# Patient Record
Sex: MALE | Race: WHITE | NOT HISPANIC OR LATINO | Employment: FULL TIME | URBAN - METROPOLITAN AREA
[De-identification: names, ages, dates, MRNs, and addresses within clinical notes are randomized per-mention and may not be internally consistent; named-entity substitution may affect disease eponyms.]

---

## 2018-01-17 ENCOUNTER — OFFICE VISIT (OUTPATIENT)
Dept: PEDIATRICS | Facility: CLINIC | Age: 23
End: 2018-01-17
Payer: COMMERCIAL

## 2018-01-17 VITALS
TEMPERATURE: 98.6 F | OXYGEN SATURATION: 98 % | HEART RATE: 88 BPM | WEIGHT: 151.2 LBS | DIASTOLIC BLOOD PRESSURE: 68 MMHG | HEIGHT: 69 IN | BODY MASS INDEX: 22.39 KG/M2 | SYSTOLIC BLOOD PRESSURE: 110 MMHG

## 2018-01-17 DIAGNOSIS — H10.31 ACUTE BACTERIAL CONJUNCTIVITIS OF RIGHT EYE: ICD-10-CM

## 2018-01-17 DIAGNOSIS — H65.01 RIGHT ACUTE SEROUS OTITIS MEDIA, RECURRENCE NOT SPECIFIED: Primary | ICD-10-CM

## 2018-01-17 PROCEDURE — 99213 OFFICE O/P EST LOW 20 MIN: CPT | Performed by: PHYSICIAN ASSISTANT

## 2018-01-17 RX ORDER — AMOXICILLIN 500 MG/1
1000 CAPSULE ORAL 2 TIMES DAILY
Qty: 40 CAPSULE | Refills: 0 | Status: SHIPPED | OUTPATIENT
Start: 2018-01-17 | End: 2018-12-17

## 2018-01-17 RX ORDER — POLYMYXIN B SULFATE AND TRIMETHOPRIM 1; 10000 MG/ML; [USP'U]/ML
1 SOLUTION OPHTHALMIC 4 TIMES DAILY
Qty: 2 ML | Refills: 0 | Status: SHIPPED | OUTPATIENT
Start: 2018-01-17 | End: 2018-01-24

## 2018-01-17 NOTE — PATIENT INSTRUCTIONS
Conjunctivitis  What is eye inflammation?   The clear membrane that lines the inside of the eyelids and covers the white of the eye (conjunctiva) can get red and swollen. This is called conjunctivitis.   How does it occur?   Conjunctivitis can be caused by many things, including infection by viruses or bacteria. Many kinds of bacteria can cause conjunctivitis. These include bacteria that cause strep, staph, and STD infections.   Conjunctivitis caused by a virus is sometimes called pink eye. It can be spread easily to other people. The same viruses that cause the common cold can cause viral conjunctivitis. Viruses can be spread by coughing or sneezing and can get in your eyes through contact with infected:  hands   washcloths or towels   cosmetics   false eyelashes   soft contact lenses  Avoid unnecessary contact with others so that you do not spread the disease.   What are the symptoms?   Symptoms may include:  itchy or scratchy eyes   redness   painful sensitivity to light   swelling of eyelids   matting of eyelashes   watery or pus discharge  How is it diagnosed?   Your healthcare provider will ask about your medical history and if you have been near someone who has conjunctivitis. Your provider will examine your eyes. He or she will also check for enlarged lymph nodes near your ear and jaw. Your provider may get lab tests of a sample of the pus to see what type of germs are present.  How is it treated?   Like a cold, viral conjunctivitis will usually go away on its own without treatment. However, your healthcare provider may prescribe eyedrops to help control your symptoms. Antihistamine pills may also relieve the itching and redness.  If you have bacterial conjunctivitis, your healthcare provider will prescribe antibiotic eyedrops. You can also help your eyes get better by washing them gently to remove any pus or crusts. Then dry them gently with a clean towel.  For very severe forms of  conjunctivitis, antibiotics may need to be given by mouth or with a shot or an IV.  If you wear contact lenses, you will need to stop wearing them until your eyes are healed. The combination of contacts and conjunctivitis may damage your cornea (the clear outer layer on the front of your eye) and cause severe vision problems. Your provider may ask you to throw away your current contact lenses and lens case.  How long will the effects last?   Viral conjunctivitis usually gets worse 5 to 7 days after the first symptoms. It can get better in 10 days to 1 month. If only one eye is affected at first, the other eye may become infected up to 2 weeks later. Usually, if both eyes are affected, the first eye has worse conjunctivitis than the second.  Bacterial conjunctivitis should improve within 2 days after you begin using antibiotics. If your eyes are not better after 3 days of antibiotics, call your healthcare provider.  How can I prevent conjunctivitis?   To keep from getting conjunctivitis from someone who has it, or to keep from spreading it to others, follow these guidelines:  Wash your hands often. Do not touch or rub your eyes.   Never share eye makeup or cosmetics with anyone. When you have conjunctivitis, throw out eye makeup you have been using.   Never use eye medicine that has been prescribed for someone else.   Do not share towels, washcloths, pillows, or sheets with anyone. If one of your eyes is affected but not the other, use a separate towel for each eye.   Avoid swimming in swimming pools if you have conjunctivitis.   Avoid close contact with people until your symptoms improve. Depending on your job, you may be asked to take some time off from work.  When should I call my healthcare provider?   Call your provider if:  You have any severe eye pain.   Your symptoms do not improve after you have used your medicine for 3 days (if you have bacterial conjunctivitis).   Your symptoms do not improve after 2 weeks  (if you have viral conjunctivitis).   Your eyes get very sensitive to light, even after the redness is gone.  Reviewed for medical accuracy by faculty at the [facility] at [facility]. Web site: [email or web address]

## 2018-01-17 NOTE — NURSING NOTE
"Chief Complaint   Patient presents with     Flu     Eye Problem       Initial /68 (BP Location: Right arm, Cuff Size: Adult Regular)  Pulse 88  Temp 98.6  F (37  C) (Oral)  Ht 5' 9\" (1.753 m)  Wt 151 lb 3.2 oz (68.6 kg)  SpO2 98%  BMI 22.33 kg/m2 Estimated body mass index is 22.33 kg/(m^2) as calculated from the following:    Height as of this encounter: 5' 9\" (1.753 m).    Weight as of this encounter: 151 lb 3.2 oz (68.6 kg).  Medication Reconciliation: complete   Jovanni Villegas CMA      "

## 2018-01-17 NOTE — PROGRESS NOTES
"  SUBJECTIVE:   Yovani Ibarra is a 22 year old male who presents to clinic today for the following health issues:    Acute Illness   Acute illness concerns: eye  Onset: 5 days    Fever: YES    Chills/Sweats: YES- both    Headache (location?): YES- frontal and temporla    Sinus Pressure:YES- mucopurulent discharge    Conjunctivitis:  YES: right--redness of eye    No contact lenses    Yellow discharge present x today    No light sensitivity    No eye pain    Irritation present    Ear Pain: no    Rhinorrhea: no    Congestion: YES- nasal and chest    Sore Throat: YES- getting better     Cough: YES-productive of yellow sputum    Wheeze: no    Decreased Appetite: no    Nausea: no    Vomiting: no    Diarrhea:  no    Dysuria/Freq.: no    Fatigue/Achiness: YES- only on sat    Sick/Strep Exposure: YES- school     Therapies Tried and outcome: none  No history of asthma, allergies.   Nonsmoker.   Special .     ROS:  ROS otherwise negative    OBJECTIVE:                                                    /68 (BP Location: Right arm, Cuff Size: Adult Regular)  Pulse 88  Temp 98.6  F (37  C) (Oral)  Ht 5' 9\" (1.753 m)  Wt 151 lb 3.2 oz (68.6 kg)  SpO2 98%  BMI 22.33 kg/m2  Body mass index is 22.33 kg/(m^2).   GENERAL: alert, no distress  Eyes:  conjunctiva/corneas- conjunctival injection OD and yellow colored discharge present right  HENT: ear canals- normal; TMs-erythemic on right; wnl on left; Nose- normal; Mouth-erythemic posterior pharynx; no sinus tenderness   NECK: tonsillar LAD  RESP: lungs clear to auscultation - no rales, no rhonchi, no wheezes  CV: regular rates and rhythm, normal S1 S2, no S3 or S4 and no murmur, no click or rub     Diagnostic test results:  No results found for this or any previous visit (from the past 24 hour(s)).       ASSESSMENT/PLAN:                                                    (H65.01) Right acute serous otitis media, recurrence not specified  (primary encounter " diagnosis)  Comment: begin antibiotics.   Plan: amoxicillin (AMOXIL) 500 MG capsule            (H10.31) Acute bacterial conjunctivitis of right eye  Comment: limit exposures.   Plan: trimethoprim-polymyxin b (POLYTRIM) ophthalmic         solution            See Patient Instructions    Luis Sarmiento PA-C  [facility]

## 2018-01-17 NOTE — MR AVS SNAPSHOT
After Visit Summary   1/17/2018    Yovani Ibarra    MRN: [id number]           Patient Information     Date Of Birth          1995        Visit Information        Provider Department      1/17/2018 11:30 AM Luis Sarmiento PA-C [facility]        Today's Diagnoses     Right acute serous otitis media, recurrence not specified    -  1    Acute bacterial conjunctivitis of right eye          Care Instructions                    Conjunctivitis  What is eye inflammation?   The clear membrane that lines the inside of the eyelids and covers the white of the eye (conjunctiva) can get red and swollen. This is called conjunctivitis.   How does it occur?   Conjunctivitis can be caused by many things, including infection by viruses or bacteria. Many kinds of bacteria can cause conjunctivitis. These include bacteria that cause strep, staph, and STD infections.   Conjunctivitis caused by a virus is sometimes called pink eye. It can be spread easily to other people. The same viruses that cause the common cold can cause viral conjunctivitis. Viruses can be spread by coughing or sneezing and can get in your eyes through contact with infected:  hands   washcloths or towels   cosmetics   false eyelashes   soft contact lenses  Avoid unnecessary contact with others so that you do not spread the disease.   What are the symptoms?   Symptoms may include:  itchy or scratchy eyes   redness   painful sensitivity to light   swelling of eyelids   matting of eyelashes   watery or pus discharge  How is it diagnosed?   Your healthcare provider will ask about your medical history and if you have been near someone who has conjunctivitis. Your provider will examine your eyes. He or she will also check for enlarged lymph nodes near your ear and jaw. Your provider may get lab tests of a sample of the pus to see what type of germs are present.  How is it treated?   Like a cold, viral conjunctivitis will usually  go away on its own without treatment. However, your healthcare provider may prescribe eyedrops to help control your symptoms. Antihistamine pills may also relieve the itching and redness.  If you have bacterial conjunctivitis, your healthcare provider will prescribe antibiotic eyedrops. You can also help your eyes get better by washing them gently to remove any pus or crusts. Then dry them gently with a clean towel.  For very severe forms of conjunctivitis, antibiotics may need to be given by mouth or with a shot or an IV.  If you wear contact lenses, you will need to stop wearing them until your eyes are healed. The combination of contacts and conjunctivitis may damage your cornea (the clear outer layer on the front of your eye) and cause severe vision problems. Your provider may ask you to throw away your current contact lenses and lens case.  How long will the effects last?   Viral conjunctivitis usually gets worse 5 to 7 days after the first symptoms. It can get better in 10 days to 1 month. If only one eye is affected at first, the other eye may become infected up to 2 weeks later. Usually, if both eyes are affected, the first eye has worse conjunctivitis than the second.  Bacterial conjunctivitis should improve within 2 days after you begin using antibiotics. If your eyes are not better after 3 days of antibiotics, call your healthcare provider.  How can I prevent conjunctivitis?   To keep from getting conjunctivitis from someone who has it, or to keep from spreading it to others, follow these guidelines:  Wash your hands often. Do not touch or rub your eyes.   Never share eye makeup or cosmetics with anyone. When you have conjunctivitis, throw out eye makeup you have been using.   Never use eye medicine that has been prescribed for someone else.   Do not share towels, washcloths, pillows, or sheets with anyone. If one of your eyes is affected but not the other, use a separate towel for each eye.   Avoid  "swimming in swimming pools if you have conjunctivitis.   Avoid close contact with people until your symptoms improve. Depending on your job, you may be asked to take some time off from work.  When should I call my healthcare provider?   Call your provider if:  You have any severe eye pain.   Your symptoms do not improve after you have used your medicine for 3 days (if you have bacterial conjunctivitis).   Your symptoms do not improve after 2 weeks (if you have viral conjunctivitis).   Your eyes get very sensitive to light, even after the redness is gone.  Reviewed for medical accuracy by faculty at the [facility] at [facility]. Web site: [email or web address]              Follow-ups after your visit        Who to contact     If you have questions or need follow up information about today's clinic visit or your schedule please contact [facility] directly at [phone].  Normal or non-critical lab and imaging results will be communicated to you by MyChart, letter or phone within 4 business days after the clinic has received the results. If you do not hear from us within 7 days, please contact the clinic through [employer] or phone. If you have a critical or abnormal lab result, we will notify you by phone as soon as possible.  Submit refill requests through Roll20 or call your pharmacy and they will forward the refill request to us. Please allow 3 business days for your refill to be completed.          Additional Information About Your Visit        VividCortex[employer]IntelliDOT Information     Roll20 lets you send messages to your doctor, view your test results, renew your prescriptions, schedule appointments and more. To sign up, go to [email or web address] . Click on \"Log in\" on the left side of the screen, which will take you to the Welcome page. Then click on \"Sign up Now\" on the right side of the page.     You will be asked to enter the access code listed below, as well as some personal " "information. Please follow the directions to create your username and password.     Your access code is: [email or web address]  Expires: 2018 11:51 AM     Your access code will  in 90 days. If you need help or a new code, please call your [facility] clinic or [phone].        Care EveryWhere ID     This is your Care EveryWhere ID. This could be used by other organizations to access your [facility] medical records  [id number]        Your Vitals Were     Pulse Temperature Height Pulse Oximetry BMI (Body Mass Index)       88 98.6  F (37  C) (Oral) 5' 9\" (1.753 m) 98% 22.33 kg/m2        Blood Pressure from Last 3 Encounters:   18 110/68   09/15/15 98/60   14 105/68    Weight from Last 3 Encounters:   18 151 lb 3.2 oz (68.6 kg)   09/15/15 153 lb 6 oz (69.6 kg)   14 159 lb (72.1 kg)              Today, you had the following     No orders found for display         Today's Medication Changes          These changes are accurate as of: 18 11:51 AM.  If you have any questions, ask your nurse or doctor.               Start taking these medicines.        Dose/Directions    amoxicillin 500 MG capsule   Commonly known as:  AMOXIL   Used for:  Right acute serous otitis media, recurrence not specified   Started by:  Luis Sarmiento PA-C        Dose:  1000 mg   Take 2 capsules (1,000 mg) by mouth 2 times daily   Quantity:  40 capsule   Refills:  0       trimethoprim-polymyxin b ophthalmic solution   Commonly known as:  POLYTRIM   Used for:  Acute bacterial conjunctivitis of right eye   Started by:  Luis Sarmiento PA-C        Dose:  1 drop   Place 1 drop into the right eye 4 times daily for 7 days   Quantity:  2 mL   Refills:  0            Where to get your medicines      These medications were sent to [place] [address] [place] [facility]  [facility] [address]     Phone:  [phone]     amoxicillin 500 MG capsule    trimethoprim-polymyxin b " ophthalmic solution                Primary Care Provider Office Phone # Fax #    Bayron Quinteros [phone] [address] [place] [address]        Equal Access to Services     GEOVANNI SHIMICHELLE : Ismael ashtyn yoon ajay Boyd, waaxda luqadaha, qaybta kaalmada al, siri duff lajeannedylon ana m. So [facility] [phone].    ATENCIÓN: Si habla español, tiene a lopes disposición servicios gratuitos de asistencia lingüística. Llame al [phone].    We comply with applicable federal civil rights laws and [place] laws. We do not discriminate on the basis of race, color, national origin, age, disability, sex, sexual orientation, or gender identity.            Thank you!     Thank you for choosing [facility]  for your care. Our goal is always to provide you with excellent care. Hearing back from our patients is one way we can continue to improve our services. Please take a few minutes to complete the written survey that you may receive in the mail after your visit with us. Thank you!             Your Updated Medication List - Protect others around you: Learn how to safely use, store and throw away your medicines at www.disposemymeds.org.          This list is accurate as of: 1/17/18 11:51 AM.  Always use your most recent med list.                   Brand Name Dispense Instructions for use Diagnosis    amoxicillin 500 MG capsule    AMOXIL    40 capsule    Take 2 capsules (1,000 mg) by mouth 2 times daily    Right acute serous otitis media, recurrence not specified       trimethoprim-polymyxin b ophthalmic solution    POLYTRIM    2 mL    Place 1 drop into the right eye 4 times daily for 7 days    Acute bacterial conjunctivitis of right eye

## 2018-12-17 ENCOUNTER — OFFICE VISIT (OUTPATIENT)
Dept: URGENT CARE | Facility: URGENT CARE | Age: 23
End: 2018-12-17
Payer: COMMERCIAL

## 2018-12-17 VITALS
OXYGEN SATURATION: 100 % | HEART RATE: 75 BPM | RESPIRATION RATE: 12 BRPM | TEMPERATURE: 98.6 F | SYSTOLIC BLOOD PRESSURE: 110 MMHG | DIASTOLIC BLOOD PRESSURE: 54 MMHG

## 2018-12-17 DIAGNOSIS — K64.9 BLEEDING HEMORRHOID: Primary | ICD-10-CM

## 2018-12-17 PROCEDURE — 99213 OFFICE O/P EST LOW 20 MIN: CPT | Performed by: PHYSICIAN ASSISTANT

## 2018-12-18 NOTE — NURSING NOTE
Chief Complaint   Patient presents with     Urgent Care     Hemorrhoids     Pain and bleeding today     S SHAMEKA, CMA

## 2018-12-18 NOTE — PROGRESS NOTES
"  SUBJECTIVE:  Yovani Ibarra is a 23 year old male  who presents with chief complaint of blood from hemorrhoid he believes.  States that has had pressure in rectal area for the past 4 days.  Has been doing warm soaks for sx.  States that he sneezed at work and had a big  release of pressure but noticed blood.  Sneezed again and some more blood.  Feels much better now.  .  He denies any abdominal pain, nausea or vomiting or fevers.  No history of melena, profuse bleeding.  He denies any pain. history of hemorrhoids.  Does admit that he pushes very hard with BM and \"maybe a little to hard at times\"    No dizziness.  Thinks that fine but just wants to make sure.  No hx of bleeding or clotting issues.      Past Medical History:   Diagnosis Date     Other atopic dermatitis and related conditions      Viral warts, unspecified 2004     No current outpatient medications on file.     Social History     Tobacco Use     Smoking status: Never Smoker     Smokeless tobacco: Never Used   Substance Use Topics     Alcohol use: No     Alcohol/week: 0.0 oz       ROS:  Complete ROS negative except as stated above    OBJECTIVE:  /54   Pulse 75   Temp 98.6  F (37  C)   Resp 12   SpO2 100%   General appearance: in no apparent distress.  Chest: clear to auscultation  Cardiac: regular rate and rhythm.  Abdomen: normal bowel sound, soft,non-tender.  Rectal exam: no tenderness noted.  No visible external hemorrhoid noted. Dried blood.  No active bleeding. No pain with internal exam.  Normal tone    ASSESSMENT:    ICD-10-CM    1. Bleeding hemorrhoid K64.9        PLAN:  No active bleeding noted.  Likely burst hemorrhoid from sneezing.  Good bowel hygiene discussed  Recommend Sitz baths, ice pack to perineum, high fiber diet and f/u with primary physician if bleeding starts again.  No labs needed.      "

## 2019-03-30 ENCOUNTER — APPOINTMENT (OUTPATIENT)
Dept: CT IMAGING | Facility: CLINIC | Age: 24
End: 2019-03-30
Attending: EMERGENCY MEDICINE
Payer: COMMERCIAL

## 2019-03-30 ENCOUNTER — HOSPITAL ENCOUNTER (EMERGENCY)
Facility: CLINIC | Age: 24
Discharge: HOME | End: 2019-03-30
Attending: EMERGENCY MEDICINE | Admitting: EMERGENCY MEDICINE
Payer: COMMERCIAL

## 2019-03-30 ENCOUNTER — APPOINTMENT (OUTPATIENT)
Dept: ULTRASOUND IMAGING | Facility: CLINIC | Age: 24
End: 2019-03-30
Attending: EMERGENCY MEDICINE
Payer: COMMERCIAL

## 2019-03-30 VITALS
WEIGHT: 150 LBS | TEMPERATURE: 97.8 F | SYSTOLIC BLOOD PRESSURE: 105 MMHG | RESPIRATION RATE: 18 BRPM | BODY MASS INDEX: 22.15 KG/M2 | OXYGEN SATURATION: 80 % | DIASTOLIC BLOOD PRESSURE: 67 MMHG | HEART RATE: 104 BPM

## 2019-03-30 DIAGNOSIS — R10.11 RUQ ABDOMINAL PAIN: ICD-10-CM

## 2019-03-30 DIAGNOSIS — R17 ELEVATED BILIRUBIN: ICD-10-CM

## 2019-03-30 LAB
ALBUMIN SERPL-MCNC: 3.9 G/DL (ref 3.4–5)
ALBUMIN UR-MCNC: NEGATIVE MG/DL
ALP SERPL-CCNC: 87 U/L (ref 40–150)
ALT SERPL W P-5'-P-CCNC: 31 U/L (ref 0–70)
ANION GAP SERPL CALCULATED.3IONS-SCNC: 7 MMOL/L (ref 3–14)
APPEARANCE UR: CLEAR
AST SERPL W P-5'-P-CCNC: 34 U/L (ref 0–45)
BASOPHILS # BLD AUTO: 0 10E9/L (ref 0–0.2)
BASOPHILS NFR BLD AUTO: 0.1 %
BILIRUB DIRECT SERPL-MCNC: 0.6 MG/DL (ref 0–0.2)
BILIRUB SERPL-MCNC: 2.3 MG/DL (ref 0.2–1.3)
BILIRUB UR QL STRIP: NEGATIVE
BUN SERPL-MCNC: 11 MG/DL (ref 7–30)
CALCIUM SERPL-MCNC: 8.8 MG/DL (ref 8.5–10.1)
CHLORIDE SERPL-SCNC: 100 MMOL/L (ref 94–109)
CO2 SERPL-SCNC: 29 MMOL/L (ref 20–32)
COLOR UR AUTO: ABNORMAL
CREAT SERPL-MCNC: 0.96 MG/DL (ref 0.66–1.25)
DIFFERENTIAL METHOD BLD: ABNORMAL
EOSINOPHIL # BLD AUTO: 0 10E9/L (ref 0–0.7)
EOSINOPHIL NFR BLD AUTO: 0.1 %
ERYTHROCYTE [DISTWIDTH] IN BLOOD BY AUTOMATED COUNT: 11.9 % (ref 10–15)
GFR SERPL CREATININE-BSD FRML MDRD: >90 ML/MIN/{1.73_M2}
GLUCOSE SERPL-MCNC: 126 MG/DL (ref 70–99)
GLUCOSE UR STRIP-MCNC: NEGATIVE MG/DL
HCT VFR BLD AUTO: 42 % (ref 40–53)
HGB BLD-MCNC: 14.9 G/DL (ref 13.3–17.7)
HGB UR QL STRIP: NEGATIVE
HYALINE CASTS #/AREA URNS LPF: 1 /LPF (ref 0–2)
IMM GRANULOCYTES # BLD: 0.1 10E9/L (ref 0–0.4)
IMM GRANULOCYTES NFR BLD: 0.3 %
INR PPP: 1.06 (ref 0.86–1.14)
KETONES UR STRIP-MCNC: 20 MG/DL
LEUKOCYTE ESTERASE UR QL STRIP: NEGATIVE
LIPASE SERPL-CCNC: 105 U/L (ref 73–393)
LYMPHOCYTES # BLD AUTO: 0.5 10E9/L (ref 0.8–5.3)
LYMPHOCYTES NFR BLD AUTO: 2.9 %
MCH RBC QN AUTO: 29.7 PG (ref 26.5–33)
MCHC RBC AUTO-ENTMCNC: 35.5 G/DL (ref 31.5–36.5)
MCV RBC AUTO: 84 FL (ref 78–100)
MONOCYTES # BLD AUTO: 0.6 10E9/L (ref 0–1.3)
MONOCYTES NFR BLD AUTO: 3.9 %
MUCOUS THREADS #/AREA URNS LPF: PRESENT /LPF
NEUTROPHILS # BLD AUTO: 14.9 10E9/L (ref 1.6–8.3)
NEUTROPHILS NFR BLD AUTO: 92.7 %
NITRATE UR QL: NEGATIVE
NRBC # BLD AUTO: 0 10*3/UL
NRBC BLD AUTO-RTO: 0 /100
PH UR STRIP: 7.5 PH (ref 5–7)
PLATELET # BLD AUTO: 291 10E9/L (ref 150–450)
POTASSIUM SERPL-SCNC: 3.4 MMOL/L (ref 3.4–5.3)
PROT SERPL-MCNC: 7.1 G/DL (ref 6.8–8.8)
RBC # BLD AUTO: 5.02 10E12/L (ref 4.4–5.9)
RBC #/AREA URNS AUTO: <1 /HPF (ref 0–2)
SODIUM SERPL-SCNC: 136 MMOL/L (ref 133–144)
SOURCE: ABNORMAL
SP GR UR STRIP: 1 (ref 1–1.03)
UROBILINOGEN UR STRIP-MCNC: NORMAL MG/DL (ref 0–2)
WBC # BLD AUTO: 16.1 10E9/L (ref 4–11)
WBC #/AREA URNS AUTO: <1 /HPF (ref 0–5)

## 2019-03-30 PROCEDURE — 80053 COMPREHEN METABOLIC PANEL: CPT | Performed by: EMERGENCY MEDICINE

## 2019-03-30 PROCEDURE — 82248 BILIRUBIN DIRECT: CPT | Performed by: EMERGENCY MEDICINE

## 2019-03-30 PROCEDURE — 96374 THER/PROPH/DIAG INJ IV PUSH: CPT | Mod: 59

## 2019-03-30 PROCEDURE — 96376 TX/PRO/DX INJ SAME DRUG ADON: CPT

## 2019-03-30 PROCEDURE — 74177 CT ABD & PELVIS W/CONTRAST: CPT

## 2019-03-30 PROCEDURE — 25000128 H RX IP 250 OP 636: Performed by: EMERGENCY MEDICINE

## 2019-03-30 PROCEDURE — 76705 ECHO EXAM OF ABDOMEN: CPT

## 2019-03-30 PROCEDURE — 25000128 H RX IP 250 OP 636

## 2019-03-30 PROCEDURE — 96375 TX/PRO/DX INJ NEW DRUG ADDON: CPT

## 2019-03-30 PROCEDURE — 25000125 ZZHC RX 250: Performed by: EMERGENCY MEDICINE

## 2019-03-30 PROCEDURE — 81001 URINALYSIS AUTO W/SCOPE: CPT | Performed by: EMERGENCY MEDICINE

## 2019-03-30 PROCEDURE — 85610 PROTHROMBIN TIME: CPT | Performed by: EMERGENCY MEDICINE

## 2019-03-30 PROCEDURE — 85025 COMPLETE CBC W/AUTO DIFF WBC: CPT | Performed by: EMERGENCY MEDICINE

## 2019-03-30 PROCEDURE — 96361 HYDRATE IV INFUSION ADD-ON: CPT

## 2019-03-30 PROCEDURE — 83690 ASSAY OF LIPASE: CPT | Performed by: EMERGENCY MEDICINE

## 2019-03-30 PROCEDURE — 99285 EMERGENCY DEPT VISIT HI MDM: CPT | Mod: 25

## 2019-03-30 RX ORDER — MORPHINE SULFATE 4 MG/ML
4 INJECTION, SOLUTION INTRAMUSCULAR; INTRAVENOUS ONCE
Status: COMPLETED | OUTPATIENT
Start: 2019-03-30 | End: 2019-03-30

## 2019-03-30 RX ORDER — ONDANSETRON 4 MG/1
4 TABLET, ORALLY DISINTEGRATING ORAL EVERY 8 HOURS PRN
Qty: 10 TABLET | Refills: 0 | Status: SHIPPED | OUTPATIENT
Start: 2019-03-30 | End: 2019-04-19

## 2019-03-30 RX ORDER — MORPHINE SULFATE 4 MG/ML
INJECTION, SOLUTION INTRAMUSCULAR; INTRAVENOUS
Status: COMPLETED
Start: 2019-03-30 | End: 2019-03-30

## 2019-03-30 RX ORDER — ONDANSETRON 2 MG/ML
4 INJECTION INTRAMUSCULAR; INTRAVENOUS ONCE
Status: COMPLETED | OUTPATIENT
Start: 2019-03-30 | End: 2019-03-30

## 2019-03-30 RX ORDER — OXYCODONE HYDROCHLORIDE 5 MG/1
5 TABLET ORAL EVERY 6 HOURS PRN
Qty: 10 TABLET | Refills: 0 | Status: SHIPPED | OUTPATIENT
Start: 2019-03-30 | End: 2019-04-05

## 2019-03-30 RX ORDER — IOPAMIDOL 755 MG/ML
500 INJECTION, SOLUTION INTRAVASCULAR ONCE
Status: COMPLETED | OUTPATIENT
Start: 2019-03-30 | End: 2019-03-30

## 2019-03-30 RX ORDER — ONDANSETRON 2 MG/ML
4 INJECTION INTRAMUSCULAR; INTRAVENOUS ONCE
Status: DISCONTINUED | OUTPATIENT
Start: 2019-03-30 | End: 2019-03-30 | Stop reason: HOSPADM

## 2019-03-30 RX ADMIN — SODIUM CHLORIDE 1000 ML: 9 INJECTION, SOLUTION INTRAVENOUS at 05:03

## 2019-03-30 RX ADMIN — SODIUM CHLORIDE 59 ML: 9 INJECTION, SOLUTION INTRAVENOUS at 06:51

## 2019-03-30 RX ADMIN — FAMOTIDINE 20 MG: 10 INJECTION, SOLUTION INTRAVENOUS at 05:21

## 2019-03-30 RX ADMIN — ONDANSETRON 4 MG: 2 INJECTION INTRAMUSCULAR; INTRAVENOUS at 05:03

## 2019-03-30 RX ADMIN — IOPAMIDOL 75 ML: 755 INJECTION, SOLUTION INTRAVENOUS at 06:51

## 2019-03-30 RX ADMIN — MORPHINE SULFATE 4 MG: 4 INJECTION, SOLUTION INTRAMUSCULAR; INTRAVENOUS at 06:03

## 2019-03-30 RX ADMIN — MORPHINE SULFATE 4 MG: 4 INJECTION INTRAVENOUS at 07:40

## 2019-03-30 RX ADMIN — MORPHINE SULFATE 4 MG: 4 INJECTION INTRAVENOUS at 06:03

## 2019-03-30 RX ADMIN — MORPHINE SULFATE 4 MG: 4 INJECTION INTRAVENOUS at 05:03

## 2019-03-30 ASSESSMENT — ENCOUNTER SYMPTOMS
VOMITING: 1
DYSURIA: 0
PALPITATIONS: 0
BACK PAIN: 1
SHORTNESS OF BREATH: 0
NAUSEA: 1
ABDOMINAL PAIN: 1

## 2019-03-30 NOTE — ED PROVIDER NOTES
History     Chief Complaint:  Abdominal Pain    The history is provided by the patient.      Yovani Ibarra is a 23 year old male who presents to the emergency department for evaluation of abdominal pain, nausea, and vomiting. The patient reports he ate pizza last night, went to bed at baseline, but awoke this morning with right-sided abdominal pain that occasionally radiates to his back. The pain worsens with movement. He denies any shortness of breath, palpitations, or dysuria, as well as history of the same or abdominal surgery.    Allergies:  No Known Drug Allergies    Medications:    Medications reviewed. No current medications.     Past Medical History:    Medical history reviewed. No pertinent medical history.    Past Surgical History:    Surgical history reviewed. No pertinent surgical history.    Family History:    Mother: Non-Hodgkin lymphoma    Social History:  The patient was accompanied to the ED by a friend.  Smoking Status: Never Smoker  Smokeless Tobacco: Never Used  Alcohol Use: Negative  Marital Status: Single      Review of Systems   Respiratory: Negative for shortness of breath.    Cardiovascular: Negative for palpitations.   Gastrointestinal: Positive for abdominal pain, nausea and vomiting.   Genitourinary: Negative for dysuria.   Musculoskeletal: Positive for back pain.   All other systems reviewed and are negative.    Physical Exam     Patient Vitals for the past 24 hrs:   BP Temp Temp src Pulse Heart Rate Resp SpO2 Weight   03/30/19 0749 -- -- -- 91 -- -- -- --   03/30/19 0630 101/55 -- -- 102 -- -- 97 % --   03/30/19 0610 123/80 -- -- -- -- -- 100 % --   03/30/19 0600 123/80 -- -- -- -- -- -- --   03/30/19 0530 126/78 -- -- -- -- -- -- --   03/30/19 0500 91/67 -- -- -- -- -- 100 % --   03/30/19 0347 (!) 134/101 97.8  F (36.6  C) Temporal -- 81 18 98 % 68 kg (150 lb)     Physical Exam  Constitutional: Vital signs reviewed as above.  HENT:    Head: No external signs of trauma noted.   Eyes:  Conjunctivae are normal. Pupils are equal, round, and reactive to light.   Cardiovascular:    Normal rate, regular rhythm and normal heart sounds.     Exam reveals no friction rub.     No murmur heard.  Pulmonary/Chest:    Effort normal and breath sounds normal.    No respiratory distress.    There are no wheezes.    There are no rales.   Gastrointestinal:    Soft.    Bowel sounds normal.    There is no distension.    There is epigastric and RUQ tenderness.    There is no rebound or guarding.   Musculoskeletal:    Normal range of motion.    Normal Tone  Neurological: Patient is alert and oriented to person, place, and time.   Skin: Skin is warm and dry. Patient is not diaphoretic  Psychiatric: The patient appears calm      Emergency Department Course     Imaging:  Radiology findings were communicated with the patient who voiced understanding of the findings.    US Abdomen, limited (RUQ only):  The common bile duct and pancreatic duct are mildly  prominent for age. Correlate with LFTs. Right upper quadrant  ultrasound is otherwise unremarkable. As per radiology.    CT Abdomen/Pelvis with IV contrast:   Periportal hepatic edema may be related to aggressive  fluid resuscitation or hepatitis. CT of the abdomen and pelvis  otherwise unremarkable. As per radiology.    Laboratory:  UA with micro: WNL    CBC: WBC: 16.1 (H), HGB: 14.9, PLT: 291  CMP: Glucose 126 (H), Bilirubin total 2.3 (H), o/w WNL (Creatinine: 0.96)  INR: 1.06    Lipase: 105    Interventions:    0503 NS 1L IV BOLUS   Morphine 4 mg IV   Zofran 4 mg IV  0521 Pepcid 20 mg IV  0603 Morphine 4 mg IV  0740 Morphine 4 mg IV    Emergency Department Course:  Nursing notes and vitals reviewed. 0440 I performed an exam of the patient as documented above.     IV inserted. Medicine administered as documented above. Blood drawn. This was sent to the lab for further testing, results above.    The patient was sent for a US Abdomen, CT Abd/Pelvis while in the emergency  department, findings above.     0605 I rechecked the patient and discussed the results of his workup thus far.     0720 I rechecked the patient.    0740 D/W Dr. Ortiz. Unlikely Gilbert's syndrome. Would consider discussion with general surgery.    0752 D/W Dr. Serrato. Consider MRCP.     0800 Re-evaluated patient. Feels better. Tolerated PO. Would like to try going home and outpatient follow up.    0805 D/W Lucinda Heredia PA-C (hospitalist service). The patient had previously not felt well, so we had considered admitting him. As he was feeling better, he elected to go home.    Findings and plan explained to the Patient and significant other. Patient discharged home with instructions regarding supportive care, medications, and reasons to return. The importance of close follow-up was reviewed. The patient was prescribed oxycodone, zofran.    Impression & Plan      Medical Decision Making:  Yovani Ibarra is a 23 year old male who presents to the emergency department today for evaluation of right upper quadrant abdominal pain.  Please see the HPI and exam for specifics.  Patient improved during his ED stay.  Given his initial discomfort I did discuss the case with gastroenterology and general surgery.  The patient is not a surgical candidate at this time.  Further diagnostic consideration with MRCP can be considered.  The patient would like to try going home.  I believe this is okay so I will order the MRCP and encouraged that he follow with his primary care clinic out of the [facility] office.  I have encouraged him and his significant other to return to the ED should he have worsening symptoms such as fever increasing right upper quadrant pain and jaundice for further evaluation.  Anticipatory guidance given prior to discharge.    Diagnosis:    ICD-10-CM    1. RUQ abdominal pain R10.11 UA with Microscopic   2. Elevated bilirubin R17 MR Abdomen MRCP w/o & w Contrast        Disposition:   discharged to  home    Discharge Medications:       Review of your medicines      START taking      Dose / Directions   ondansetron 4 MG ODT tab  Commonly known as:  ZOFRAN ODT      Dose:  4 mg  Take 1 tablet (4 mg) by mouth every 8 hours as needed for vomiting  Quantity:  10 tablet  Refills:  0     oxyCODONE 5 MG tablet  Commonly known as:  ROXICODONE      Dose:  5 mg  Take 1 tablet (5 mg) by mouth every 6 hours as needed for severe pain  Quantity:  10 tablet  Refills:  0           Where to get your medicines      Some of these will need a paper prescription and others can be bought over the counter. Ask your nurse if you have questions.    Bring a paper prescription for each of these medications    ondansetron 4 MG ODT tab    oxyCODONE 5 MG tablet       Issac GOODEN, shaye serving as a scribe on 3/30/2019 at 4:45 AM to personally document services performed by Gabe Nix DO based on my observations and the provider's statements to me.     Issac Krueger  3/30/2019  [facility] EMERGENCY DEPARTMENT       Gabe Nix DO  03/30/19 0820

## 2019-03-30 NOTE — ED AVS SNAPSHOT
[facility] Emergency Department  [address]  [facility] [address]  Phone:  [phone]  Fax:  [phone]                                    Yovani Iabrra   MRN: [id number]    Department:  [facility] Emergency Department   Date of Visit:  3/30/2019           After Visit Summary Signature Page    I have received my discharge instructions, and my questions have been answered. I have discussed any challenges I see with this plan with the nurse or doctor.    ..........................................................................................................................................  Patient/Patient Representative Signature      ..........................................................................................................................................  Patient Representative Print Name and Relationship to Patient    ..................................................               ................................................  Date                                   Time    ..........................................................................................................................................  Reviewed by Signature/Title    ...................................................              ..............................................  Date                                               Time          22EPIC Rev 08/18

## 2019-03-30 NOTE — ED NOTES
Patient verbalized understanding of discharge instruction and follow up care. No questions or concerns at this time.

## 2019-04-01 ENCOUNTER — OFFICE VISIT (OUTPATIENT)
Dept: URGENT CARE | Facility: URGENT CARE | Age: 24
End: 2019-04-01
Payer: COMMERCIAL

## 2019-04-01 VITALS
BODY MASS INDEX: 21.22 KG/M2 | DIASTOLIC BLOOD PRESSURE: 56 MMHG | HEART RATE: 56 BPM | RESPIRATION RATE: 16 BRPM | TEMPERATURE: 98.4 F | WEIGHT: 140 LBS | HEIGHT: 68 IN | SYSTOLIC BLOOD PRESSURE: 98 MMHG

## 2019-04-01 DIAGNOSIS — R17 JAUNDICE: Primary | ICD-10-CM

## 2019-04-01 ASSESSMENT — MIFFLIN-ST. JEOR: SCORE: 1594.54

## 2019-04-01 NOTE — PROGRESS NOTES
"  SUBJECTIVE:   Yovani Ibarra is a 23 year old male who presents to clinic today for the following health issues:      ED/UC Followup: Abdominal pain and elevated Bilirubin    Facility:  [facility]   Date of visit: 3/30/19  Reason for visit: Abdominal pain and elevated Bilirubin  Current Status: woke up today feel cramping in stomach feels like he needs to have a BM but can not, eyes are turning jaundice again. Patient states skin is looking better than before but face is still a slight yellow . Patient had fever Saturday/sunday     Ate pizza at 11:30 PM Friday night; had had some abd pain earlier that day.  After eating pizza, noted significant pain, followed by vomiting.  Then over the next 2 hours, continued to vomit, and had worsening abd pain; taken to emergency room; vomted bile again.  Given IVF and morphine, had an ultrasound and CT. (Possible periheptatic edema).  liver function tests within normal limits, but bili was 2.3.  Direct 0.6      Next day, slept most of the next day; had a fever to 105; fluctated up and down alot.  Then 2 and 1 day ago, temps normalized.  No further chills and sweats.  Has been eating soups and liquids okay; ate a sandwich yesterday.  Feels like has to have bowel movement, but feels somewhat \"clogged up.\"  No further nausea.  Wife feels like icterus is improved.     No exotic foods; no traveling.  No alcohol, and no IVDA or other sexually transmitted disease exposure.       Problem list and histories reviewed & adjusted, as indicated.  Additional history: as documented    There is no problem list on file for this patient.    History reviewed. No pertinent surgical history.    Social History     Tobacco Use     Smoking status: Never Smoker     Smokeless tobacco: Never Used   Substance Use Topics     Alcohol use: No     Alcohol/week: 0.0 oz     Family History   Problem Relation Age of Onset     Cancer Mother         NonHodgkins lymphoma     C.A.D. Paternal Grandfather      C.A.D. " "Maternal Grandmother         Open heart surgery     Hypertension Maternal Grandmother      Diabetes Maternal Grandmother          Current Outpatient Medications   Medication Sig Dispense Refill     ondansetron (ZOFRAN ODT) 4 MG ODT tab Take 1 tablet (4 mg) by mouth every 8 hours as needed for vomiting (Patient not taking: Reported on 4/2/2019) 10 tablet 0     oxyCODONE (ROXICODONE) 5 MG tablet Take 1 tablet (5 mg) by mouth every 6 hours as needed for severe pain (Patient not taking: Reported on 4/2/2019) 10 tablet 0     Allergies   Allergen Reactions     No Known Allergies      BP Readings from Last 3 Encounters:   04/02/19 102/56   04/01/19 98/56   03/30/19 105/67    Wt Readings from Last 3 Encounters:   04/02/19 63.5 kg (140 lb)   04/01/19 63.5 kg (140 lb)   03/30/19 68 kg (150 lb)                  Labs reviewed in EPIC    Reviewed and updated as needed this visit by clinical staff       Reviewed and updated as needed this visit by Provider         ROS:  CONSTITUTIONAL: NEGATIVE for fever, chills, change in weight  ENT/MOUTH: NEGATIVE for ear, mouth and throat problems  RESP: NEGATIVE for significant cough or SOB  CV: NEGATIVE for chest pain, palpitations or peripheral edema    OBJECTIVE:                                                    /56 (BP Location: Right arm, Patient Position: Sitting, Cuff Size: Adult Regular)   Pulse 75   Temp 97.4  F (36.3  C) (Tympanic)   Ht 1.727 m (5' 8\")   Wt 63.5 kg (140 lb)   SpO2 100%   BMI 21.29 kg/m    Body mass index is 21.29 kg/m .   GENERAL: healthy, alert, well nourished, well hydrated, no distress  HENT: ear canals- normal; TMs- normal; Nose- normal; Mouth- no ulcers, no lesions  NECK: no tenderness, no adenopathy, no asymmetry, no masses, no stiffness; thyroid- normal to palpation  RESP: lungs clear to auscultation - no rales, no rhonchi, no wheezes  CV: regular rates and rhythm, normal S1 S2, no S3 or S4 and no murmur, no click or rub -  ABDOMEN: soft, but " very mild bilateral UQ tenderness, no  hepatosplenomegaly, no masses, normal bowel sounds    Diagnostic test results:  Diagnostic Test Results:  none      ASSESSMENT/PLAN:                                                        4. Jaundice  Most likely had a gallstone that passed before imaging was done.  However, need to r/o other obtructive causes (including cancer) with MRCP.  For now, maintain a clear liquid diet and follow up after MRCP.  Repeating liver function tests and bili today.   - HIV Screening  - Hepatitis C antibody  - Hepatitis B surface antigen  - Hepatitis B Surface Antibody  - Hepatitis Antibody A IgG  - Hepatitis A antibody IgM  - CMV antibody IgM  - EBV Capsid Antibody IgM  - MR Abdomen MRCP w/o & w Contrast; Future  - Comprehensive metabolic panel  - Bilirubin direct  - Lipase  - CBC with platelets and differential      See Patient Instructions    Bayron Quinteros MD  [facility]

## 2019-04-01 NOTE — PROGRESS NOTES
"  SUBJECTIVE:   Yovani Ibarra is a 23 year old male who presents to clinic today for the following health issues:      Abdominal Pain      Duration: ***    Description (location/character/radiation): ***       Associated flank pain: {NONEORCHOOSE:127816::\"None\"}    Intensity:  {mild,moderate,severe:378665}    Accompanying signs and symptoms:        Fever/Chills: { :317766}       Gas/Bloating: { :742352}       Nausea/vomitting: { :766114}       Diarrhea: { :899575}       Dysuria or Hematuria: { :532084}    History (previous similar pain/trauma/previous testing): ***    Precipitating or alleviating factors:       Pain worse with eating/BM/urination: ***       Pain relieved by BM: { :743399}    Therapies tried and outcome: {NONEORCHOOSE:309512::\"None\"}    LMP:  not applicable    Elevated Bilirubin    {additional problems for provider to add:849542}    Problem list and histories reviewed & adjusted, as indicated.  Additional history: {NONE - AS DOCUMENTED:488568::\"as documented\"}    {HIST REVIEW/ LINKS 2:737897}    Reviewed and updated as needed this visit by clinical staff       Reviewed and updated as needed this visit by Provider         {PROVIDER CHARTING PREFERENCE:534763}  "

## 2019-04-01 NOTE — LETTER
[facility]  [address]  [address]  [facility] [address] [phone]      April 1, 2019    RE:  Yovani Ibarra                                                                                                                                                       [facility] [facility] [address]            To whom it may concern:    Yovani Ibarra is under my professional care for medical issue.   Patient had been seen and evaluated in the clinic today.  He has a Follow-up appointment tomorrow at 10 am and will need to miss work.        Sincerely,        Ana Briggs    [facility]

## 2019-04-01 NOTE — PROGRESS NOTES
Is going to Follow-up with PCP tomorrow.  Abdominal pain better.  Taking in fluids well.  Still Jaundice. Urinating fine.   Hold labs for now and will have run with PCP.  Red flag signs discussed and to ER tonight if sx worsen

## 2019-04-02 ENCOUNTER — OFFICE VISIT (OUTPATIENT)
Dept: PEDIATRICS | Facility: CLINIC | Age: 24
End: 2019-04-02
Payer: COMMERCIAL

## 2019-04-02 VITALS
HEIGHT: 68 IN | OXYGEN SATURATION: 100 % | WEIGHT: 140 LBS | TEMPERATURE: 97.4 F | BODY MASS INDEX: 21.22 KG/M2 | SYSTOLIC BLOOD PRESSURE: 102 MMHG | DIASTOLIC BLOOD PRESSURE: 56 MMHG | HEART RATE: 75 BPM

## 2019-04-02 DIAGNOSIS — R17 JAUNDICE: Primary | ICD-10-CM

## 2019-04-02 DIAGNOSIS — Z11.4 SCREENING FOR HIV (HUMAN IMMUNODEFICIENCY VIRUS): ICD-10-CM

## 2019-04-02 DIAGNOSIS — Z23 NEED FOR HPV VACCINE: ICD-10-CM

## 2019-04-02 DIAGNOSIS — Z23 NEED FOR PROPHYLACTIC VACCINATION AND INOCULATION AGAINST INFLUENZA: ICD-10-CM

## 2019-04-02 LAB
ALBUMIN SERPL-MCNC: 3.5 G/DL (ref 3.4–5)
ALP SERPL-CCNC: 116 U/L (ref 40–150)
ALT SERPL W P-5'-P-CCNC: 146 U/L (ref 0–70)
ANION GAP SERPL CALCULATED.3IONS-SCNC: 5 MMOL/L (ref 3–14)
AST SERPL W P-5'-P-CCNC: 57 U/L (ref 0–45)
BASOPHILS # BLD AUTO: 0 10E9/L (ref 0–0.2)
BASOPHILS NFR BLD AUTO: 0.6 %
BILIRUB DIRECT SERPL-MCNC: 2.9 MG/DL (ref 0–0.2)
BILIRUB SERPL-MCNC: 4.4 MG/DL (ref 0.2–1.3)
BUN SERPL-MCNC: 9 MG/DL (ref 7–30)
CALCIUM SERPL-MCNC: 9.2 MG/DL (ref 8.5–10.1)
CHLORIDE SERPL-SCNC: 106 MMOL/L (ref 94–109)
CO2 SERPL-SCNC: 28 MMOL/L (ref 20–32)
CREAT SERPL-MCNC: 0.93 MG/DL (ref 0.66–1.25)
DIFFERENTIAL METHOD BLD: ABNORMAL
EOSINOPHIL # BLD AUTO: 0.2 10E9/L (ref 0–0.7)
EOSINOPHIL NFR BLD AUTO: 6.1 %
ERYTHROCYTE [DISTWIDTH] IN BLOOD BY AUTOMATED COUNT: 12.5 % (ref 10–15)
GFR SERPL CREATININE-BSD FRML MDRD: >90 ML/MIN/{1.73_M2}
GLUCOSE SERPL-MCNC: 131 MG/DL (ref 70–99)
HAV IGG SER QL IA: REACTIVE
HAV IGM SERPL QL IA: NONREACTIVE
HBV SURFACE AB SERPL IA-ACNC: 1.98 M[IU]/ML
HBV SURFACE AG SERPL QL IA: NONREACTIVE
HCT VFR BLD AUTO: 42.7 % (ref 40–53)
HCV AB SERPL QL IA: NONREACTIVE
HGB BLD-MCNC: 14.6 G/DL (ref 13.3–17.7)
HIV 1+2 AB+HIV1 P24 AG SERPL QL IA: NONREACTIVE
LIPASE SERPL-CCNC: 193 U/L (ref 73–393)
LYMPHOCYTES # BLD AUTO: 0.8 10E9/L (ref 0.8–5.3)
LYMPHOCYTES NFR BLD AUTO: 25.2 %
MCH RBC QN AUTO: 29.2 PG (ref 26.5–33)
MCHC RBC AUTO-ENTMCNC: 34.2 G/DL (ref 31.5–36.5)
MCV RBC AUTO: 85 FL (ref 78–100)
MONOCYTES # BLD AUTO: 0.4 10E9/L (ref 0–1.3)
MONOCYTES NFR BLD AUTO: 13 %
NEUTROPHILS # BLD AUTO: 1.8 10E9/L (ref 1.6–8.3)
NEUTROPHILS NFR BLD AUTO: 55.1 %
PLATELET # BLD AUTO: 257 10E9/L (ref 150–450)
POTASSIUM SERPL-SCNC: 4.5 MMOL/L (ref 3.4–5.3)
PROT SERPL-MCNC: 7 G/DL (ref 6.8–8.8)
RBC # BLD AUTO: 5 10E12/L (ref 4.4–5.9)
SODIUM SERPL-SCNC: 139 MMOL/L (ref 133–144)
WBC # BLD AUTO: 3.3 10E9/L (ref 4–11)

## 2019-04-02 PROCEDURE — 82248 BILIRUBIN DIRECT: CPT | Performed by: INTERNAL MEDICINE

## 2019-04-02 PROCEDURE — 85025 COMPLETE CBC W/AUTO DIFF WBC: CPT | Performed by: INTERNAL MEDICINE

## 2019-04-02 PROCEDURE — 86645 CMV ANTIBODY IGM: CPT | Performed by: INTERNAL MEDICINE

## 2019-04-02 PROCEDURE — 99214 OFFICE O/P EST MOD 30 MIN: CPT | Performed by: INTERNAL MEDICINE

## 2019-04-02 PROCEDURE — 86709 HEPATITIS A IGM ANTIBODY: CPT | Performed by: INTERNAL MEDICINE

## 2019-04-02 PROCEDURE — 86665 EPSTEIN-BARR CAPSID VCA: CPT | Performed by: INTERNAL MEDICINE

## 2019-04-02 PROCEDURE — 83690 ASSAY OF LIPASE: CPT | Performed by: INTERNAL MEDICINE

## 2019-04-02 PROCEDURE — 86708 HEPATITIS A ANTIBODY: CPT | Performed by: INTERNAL MEDICINE

## 2019-04-02 PROCEDURE — 86803 HEPATITIS C AB TEST: CPT | Performed by: INTERNAL MEDICINE

## 2019-04-02 PROCEDURE — 86706 HEP B SURFACE ANTIBODY: CPT | Performed by: INTERNAL MEDICINE

## 2019-04-02 PROCEDURE — 87389 HIV-1 AG W/HIV-1&-2 AB AG IA: CPT | Performed by: INTERNAL MEDICINE

## 2019-04-02 PROCEDURE — 87340 HEPATITIS B SURFACE AG IA: CPT | Performed by: INTERNAL MEDICINE

## 2019-04-02 PROCEDURE — 80053 COMPREHEN METABOLIC PANEL: CPT | Performed by: INTERNAL MEDICINE

## 2019-04-02 PROCEDURE — 36415 COLL VENOUS BLD VENIPUNCTURE: CPT | Performed by: INTERNAL MEDICINE

## 2019-04-02 ASSESSMENT — MIFFLIN-ST. JEOR: SCORE: 1594.54

## 2019-04-02 NOTE — PATIENT INSTRUCTIONS
"Lab work downstairs today.  Directions:  As you walk through the first floor, you'll see (on the right) first the pharmacy, then some bathrooms, then the \"Lab and Imaging\" area. Give them your name at the window there and wait for them to call you.     They will call you for an MRCP.     Follow-up with me after the MRCP.      In the meantime, stick to just clear liquids for a few days, then add in toast, rice, crackers, applesauce, bananas.  LImit spicy foods, acidic foods.      Bayron Quinteros MD  Internal Medicine and Pediatrics     "

## 2019-04-03 LAB
CMV IGM SERPL QL IA: <0.2 AI (ref 0–0.8)
EBV VCA IGM SER QL IA: 0.6 AI (ref 0–0.8)

## 2019-04-04 ENCOUNTER — MYC MEDICAL ADVICE (OUTPATIENT)
Dept: PEDIATRICS | Facility: CLINIC | Age: 24
End: 2019-04-04

## 2019-04-05 ENCOUNTER — OFFICE VISIT (OUTPATIENT)
Dept: PEDIATRICS | Facility: CLINIC | Age: 24
End: 2019-04-05
Payer: COMMERCIAL

## 2019-04-05 VITALS
HEART RATE: 78 BPM | OXYGEN SATURATION: 99 % | TEMPERATURE: 98.1 F | HEIGHT: 68 IN | WEIGHT: 143.3 LBS | SYSTOLIC BLOOD PRESSURE: 96 MMHG | DIASTOLIC BLOOD PRESSURE: 58 MMHG | BODY MASS INDEX: 21.72 KG/M2

## 2019-04-05 DIAGNOSIS — R17 JAUNDICE: Primary | ICD-10-CM

## 2019-04-05 DIAGNOSIS — K59.00 CONSTIPATION, UNSPECIFIED CONSTIPATION TYPE: ICD-10-CM

## 2019-04-05 LAB
ALBUMIN SERPL-MCNC: 3.7 G/DL (ref 3.4–5)
ALP SERPL-CCNC: 118 U/L (ref 40–150)
ALT SERPL W P-5'-P-CCNC: 104 U/L (ref 0–70)
ANION GAP SERPL CALCULATED.3IONS-SCNC: 12 MMOL/L (ref 3–14)
AST SERPL W P-5'-P-CCNC: 58 U/L (ref 0–45)
BILIRUB SERPL-MCNC: 4.4 MG/DL (ref 0.2–1.3)
BUN SERPL-MCNC: 11 MG/DL (ref 7–30)
CALCIUM SERPL-MCNC: 9.8 MG/DL (ref 8.5–10.1)
CHLORIDE SERPL-SCNC: 102 MMOL/L (ref 94–109)
CO2 SERPL-SCNC: 31 MMOL/L (ref 20–32)
CREAT SERPL-MCNC: 1.1 MG/DL (ref 0.66–1.25)
GFR SERPL CREATININE-BSD FRML MDRD: >90 ML/MIN/{1.73_M2}
GGT SERPL-CCNC: 140 U/L (ref 0–75)
GLUCOSE SERPL-MCNC: 97 MG/DL (ref 70–99)
POTASSIUM SERPL-SCNC: 3.8 MMOL/L (ref 3.4–5.3)
PROT SERPL-MCNC: 7.3 G/DL (ref 6.8–8.8)
SODIUM SERPL-SCNC: 145 MMOL/L (ref 133–144)

## 2019-04-05 PROCEDURE — 86038 ANTINUCLEAR ANTIBODIES: CPT | Performed by: INTERNAL MEDICINE

## 2019-04-05 PROCEDURE — 80053 COMPREHEN METABOLIC PANEL: CPT | Performed by: INTERNAL MEDICINE

## 2019-04-05 PROCEDURE — 36415 COLL VENOUS BLD VENIPUNCTURE: CPT | Performed by: INTERNAL MEDICINE

## 2019-04-05 PROCEDURE — 83516 IMMUNOASSAY NONANTIBODY: CPT | Mod: 91 | Performed by: INTERNAL MEDICINE

## 2019-04-05 PROCEDURE — 99214 OFFICE O/P EST MOD 30 MIN: CPT | Performed by: INTERNAL MEDICINE

## 2019-04-05 PROCEDURE — 99000 SPECIMEN HANDLING OFFICE-LAB: CPT | Performed by: INTERNAL MEDICINE

## 2019-04-05 PROCEDURE — 83516 IMMUNOASSAY NONANTIBODY: CPT | Mod: 90 | Performed by: INTERNAL MEDICINE

## 2019-04-05 PROCEDURE — 82977 ASSAY OF GGT: CPT | Performed by: INTERNAL MEDICINE

## 2019-04-05 RX ORDER — POLYETHYLENE GLYCOL 3350 17 G/17G
1 POWDER, FOR SOLUTION ORAL DAILY
COMMUNITY
Start: 2019-04-05 | End: 2019-04-19

## 2019-04-05 ASSESSMENT — MIFFLIN-ST. JEOR: SCORE: 1609.5

## 2019-04-05 NOTE — PROGRESS NOTES
"  SUBJECTIVE:   Yovani Ibarra is a 23 year old male who presents to clinic today for the following health issues:    Patient is here today to follow up on Jaundice. Last OV: 4/2/19. Patient states: still having stomach problems - states he gets \"adrenaline\" in stomach area when stressed, sometimes lightheadedness. States his concern is constipation - has not had BM in 1 week.    Having some discomfort of abdmen; feels like is consitpated.  Has hemorrhoids, and is worried about these.  Feels like a lot of his discomfort is from that.      Still has dark urine; brownish yellow; gold.  Feels like it is heavy.      Some light headedness when he gets adrenaline.     Still denies any toxin or drug exposures.  Is back at work for last 2 days.  Color looks about the same to me, but \"improved\" to him.      Problem list and histories reviewed & adjusted, as indicated.  Additional history: as documented    There is no problem list on file for this patient.    History reviewed. No pertinent surgical history.    Social History     Tobacco Use     Smoking status: Never Smoker     Smokeless tobacco: Never Used   Substance Use Topics     Alcohol use: No     Alcohol/week: 0.0 oz     Family History   Problem Relation Age of Onset     Cancer Mother         NonHodgkins lymphoma     C.A.D. Paternal Grandfather      C.A.D. Maternal Grandmother         Open heart surgery     Hypertension Maternal Grandmother      Diabetes Maternal Grandmother          Current Outpatient Medications   Medication Sig Dispense Refill     ondansetron (ZOFRAN ODT) 4 MG ODT tab Take 1 tablet (4 mg) by mouth every 8 hours as needed for vomiting 10 tablet 0     polyethylene glycol (MIRALAX/GLYCOLAX) powder Take 17 g (1 capful) by mouth daily       Allergies   Allergen Reactions     No Known Allergies      BP Readings from Last 3 Encounters:   04/05/19 96/58   04/02/19 102/56   04/01/19 98/56    Wt Readings from Last 3 Encounters:   04/05/19 65 kg (143 lb 4.8 oz) " "  04/02/19 63.5 kg (140 lb)   04/01/19 63.5 kg (140 lb)                  Labs reviewed in EPIC    Reviewed and updated as needed this visit by clinical staff  Tobacco  Allergies  Meds  Med Hx  Surg Hx  Fam Hx  Soc Hx      Reviewed and updated as needed this visit by Provider         ROS:  CONSTITUTIONAL: NEGATIVE for fever, chills, change in weight  ENT/MOUTH: NEGATIVE for ear, mouth and throat problems  RESP: NEGATIVE for significant cough or SOB  CV: NEGATIVE for chest pain, palpitations or peripheral edema    OBJECTIVE:                                                    BP 96/58 (BP Location: Right arm, Cuff Size: Adult Regular)   Pulse 78   Temp 98.1  F (36.7  C) (Tympanic)   Ht 1.727 m (5' 8\")   Wt 65 kg (143 lb 4.8 oz)   SpO2 99%   BMI 21.79 kg/m    Body mass index is 21.79 kg/m .   GENERAL: healthy, alert, well nourished, well hydrated, no distress  HENT: ear canals- normal; TMs- normal; Nose- normal; Mouth- no ulcers, no lesions  NECK: no tenderness, no adenopathy, no asymmetry, no masses, no stiffness; thyroid- normal to palpation  RESP: lungs clear to auscultation - no rales, no rhonchi, no wheezes  CV: regular rates and rhythm, normal S1 S2, no S3 or S4 and no murmur, no click or rub -  ABDOMEN: soft, no tenderness, no  hepatosplenomegaly, no masses, normal bowel sounds    Diagnostic test results:  Diagnostic Test Results:  none      ASSESSMENT/PLAN:                                                    1. Jaundice  Will proceed with screens as below for PBC and PSC and AIH.   If MRCP not helpful, advised to see gastroenterology for possible ERCP, given concern for malignancy.  Still however, the most liekly diagnosis seems to be a passed (or retained) gallston. e  - HCL MITOCHONDRIAL AB W/REFLEX  - Anti Nuclear Mirela IgG by IFA with Reflex  - F Actin EIA with reflex  - GGT  - Comprehensive metabolic panel  - GASTROENTEROLOGY ADULT REF CONSULT ONLY  - Mitochondrial M2 Antibody IgG    2. " Constipation, unspecified constipation type  Begin miralax and dulcolax this weekend.   - polyethylene glycol (MIRALAX/GLYCOLAX) powder; Take 17 g (1 capful) by mouth daily  - Mitochondrial M2 Antibody IgG    E4: Spent 25 minutes face to face.     More than 50% of the time was spent in counseling and coordination of care of these issues.    See Patient Instructions    Bayron Quinteros MD  [facility]

## 2019-04-05 NOTE — PATIENT INSTRUCTIONS
"Lab work downstairs today.  Directions:  As you walk through the first floor, you'll see (on the right) first the pharmacy, then some bathrooms, then the \"Lab and Imaging\" area. Give them your name at the window there and wait for them to call you.     Proceed with MRCP on MOnday.    With respect to constipation:  Begin miralax 17 g daily.  May increase twice daily.    May also do dulcolax orally nightly before bed.    Call gastroenterology for an appointment.    Bayron Quinteros MD  Internal Medicine and Pediatrics     "

## 2019-04-08 ENCOUNTER — HOSPITAL ENCOUNTER (OUTPATIENT)
Dept: MRI IMAGING | Facility: CLINIC | Age: 24
Discharge: HOME | End: 2019-04-08
Attending: INTERNAL MEDICINE | Admitting: INTERNAL MEDICINE
Payer: COMMERCIAL

## 2019-04-08 DIAGNOSIS — R17 JAUNDICE: ICD-10-CM

## 2019-04-08 PROBLEM — K59.00 CONSTIPATION, UNSPECIFIED CONSTIPATION TYPE: Status: ACTIVE | Noted: 2019-04-08

## 2019-04-08 LAB
ANA SER QL IF: NEGATIVE
MITOCHONDRIA M2 IGG SER-ACNC: <1 U/ML
SMA IGG SER-ACNC: 6 UNITS (ref 0–19)

## 2019-04-08 PROCEDURE — 74183 MRI ABD W/O CNTR FLWD CNTR: CPT

## 2019-04-08 PROCEDURE — A9585 GADOBUTROL INJECTION: HCPCS | Performed by: INTERNAL MEDICINE

## 2019-04-08 PROCEDURE — 25500064 ZZH RX 255 OP 636: Performed by: INTERNAL MEDICINE

## 2019-04-08 RX ORDER — GADOBUTROL 604.72 MG/ML
7.5 INJECTION INTRAVENOUS ONCE
Status: COMPLETED | OUTPATIENT
Start: 2019-04-08 | End: 2019-04-08

## 2019-04-08 RX ADMIN — GADOBUTROL 7 ML: 604.72 INJECTION INTRAVENOUS at 07:33

## 2019-04-18 NOTE — PROGRESS NOTES
SUBJECTIVE:   Yovani Ibarra is a 23 year old male who presents to clinic today for the following   health issues:    Patient is here today to follow up on Jaundice. Last OV: 19. Patient states: things are going a lot better. Had recent MRI. Would like updated blood work done.    Not seen by gastroenterology. Never went.    Feels much better; bowel movements are within normal limits, about once or twice daily; brown.  No floating stools. No nausea, vomiting, diarrhea, or constipation.     MRI was found to be within normal limits.      Did note some itchiness at night last week, but this has improved.  Itchiness now better.     Last 2 Bilirubins were 4.4.      Additional history: as documented    Reviewed  and updated as needed this visit by clinical staff         Reviewed and updated as needed this visit by Provider         Patient Active Problem List   Diagnosis     Constipation, unspecified constipation type     History reviewed. No pertinent surgical history.    Social History     Tobacco Use     Smoking status: Former Smoker     Packs/day: 0.50     Years: 1.00     Pack years: 0.50     Types: Cigarettes, Dip, chew, snus or snuff     Start date: 2017     Last attempt to quit: 2018     Years since quittin.6     Smokeless tobacco: Former User     Quit date: 2019     Tobacco comment: Noticed adverse effects and decided to quit   Substance Use Topics     Alcohol use: No     Alcohol/week: 0.0 oz     Family History   Problem Relation Age of Onset     Cancer Mother         NonHodgkins lymphoma     C.A.D. Paternal Grandfather      C.A.D. Maternal Grandmother         Open heart surgery     Hypertension Maternal Grandmother      Diabetes Maternal Grandmother          No current outpatient medications on file.     Allergies   Allergen Reactions     No Known Allergies      BP Readings from Last 3 Encounters:   19 92/68   19 96/58   19 102/56    Wt Readings from Last 3 Encounters:  "  04/19/19 63.8 kg (140 lb 11.2 oz)   04/05/19 65 kg (143 lb 4.8 oz)   04/02/19 63.5 kg (140 lb)                  Labs reviewed in EPIC    ROS:  CONSTITUTIONAL: NEGATIVE for fever, chills, change in weight  ENT/MOUTH: NEGATIVE for ear, mouth and throat problems  RESP: NEGATIVE for significant cough or SOB  CV: NEGATIVE for chest pain, palpitations or peripheral edema    OBJECTIVE:                                                    BP 92/68 (BP Location: Right arm, Patient Position: Sitting, Cuff Size: Adult Regular)   Pulse 72   Temp 98.3  F (36.8  C) (Oral)   Ht 1.727 m (5' 8\")   Wt 63.8 kg (140 lb 11.2 oz)   SpO2 100%   BMI 21.39 kg/m    Body mass index is 21.39 kg/m .   GENERAL: healthy, alert, well nourished, well hydrated, no distress  HENT: ear canals- normal; TMs- normal; Nose- normal; Mouth- no ulcers, no lesions  NECK: no tenderness, no adenopathy, no asymmetry, no masses, no stiffness; thyroid- normal to palpation  RESP: lungs clear to auscultation - no rales, no rhonchi, no wheezes  CV: regular rates and rhythm, normal S1 S2, no S3 or S4 and no murmur, no click or rub -  ABDOMEN: soft, no tenderness, no  hepatosplenomegaly, no masses, normal bowel sounds    Diagnostic test results:  Diagnostic Test Results:  none      ASSESSMENT/PLAN:                                                        3. Jaundice  Subjectively much improved.  No further abd pain. Appetite back.    Patient Instructions   Lab work downstairs today.  Directions:  As you walk through the first floor, you'll see (on the right) first the pharmacy, then some bathrooms, then the \"Lab and Imaging\" area. Give them your name at the window there and wait for them to call you.     If your bilirubin has not normalized, we can reconsider seeing a gastroenterology specialist.    Bayron Quinteros MD  Internal Medicine and Pediatrics        - Comprehensive metabolic panel      See Patient Instructions    Bayron Quinteros MD  [facility]      "

## 2019-04-19 ENCOUNTER — OFFICE VISIT (OUTPATIENT)
Dept: PEDIATRICS | Facility: CLINIC | Age: 24
End: 2019-04-19
Payer: COMMERCIAL

## 2019-04-19 VITALS
OXYGEN SATURATION: 100 % | WEIGHT: 140.7 LBS | SYSTOLIC BLOOD PRESSURE: 92 MMHG | BODY MASS INDEX: 21.32 KG/M2 | HEART RATE: 72 BPM | TEMPERATURE: 98.3 F | HEIGHT: 68 IN | DIASTOLIC BLOOD PRESSURE: 68 MMHG

## 2019-04-19 DIAGNOSIS — Z23 NEED FOR HPV VACCINE: ICD-10-CM

## 2019-04-19 DIAGNOSIS — R17 JAUNDICE: Primary | ICD-10-CM

## 2019-04-19 DIAGNOSIS — Z23 NEED FOR PROPHYLACTIC VACCINATION AND INOCULATION AGAINST INFLUENZA: ICD-10-CM

## 2019-04-19 LAB
ALBUMIN SERPL-MCNC: 3.6 G/DL (ref 3.4–5)
ALP SERPL-CCNC: 131 U/L (ref 40–150)
ALT SERPL W P-5'-P-CCNC: 150 U/L (ref 0–70)
ANION GAP SERPL CALCULATED.3IONS-SCNC: 7 MMOL/L (ref 3–14)
AST SERPL W P-5'-P-CCNC: 61 U/L (ref 0–45)
BILIRUB SERPL-MCNC: 3 MG/DL (ref 0.2–1.3)
BUN SERPL-MCNC: 9 MG/DL (ref 7–30)
CALCIUM SERPL-MCNC: 9.1 MG/DL (ref 8.5–10.1)
CHLORIDE SERPL-SCNC: 108 MMOL/L (ref 94–109)
CO2 SERPL-SCNC: 27 MMOL/L (ref 20–32)
CREAT SERPL-MCNC: 0.9 MG/DL (ref 0.66–1.25)
GFR SERPL CREATININE-BSD FRML MDRD: >90 ML/MIN/{1.73_M2}
GLUCOSE SERPL-MCNC: 95 MG/DL (ref 70–99)
POTASSIUM SERPL-SCNC: 3.7 MMOL/L (ref 3.4–5.3)
PROT SERPL-MCNC: 6.9 G/DL (ref 6.8–8.8)
SODIUM SERPL-SCNC: 142 MMOL/L (ref 133–144)

## 2019-04-19 PROCEDURE — 99213 OFFICE O/P EST LOW 20 MIN: CPT | Performed by: INTERNAL MEDICINE

## 2019-04-19 PROCEDURE — 36415 COLL VENOUS BLD VENIPUNCTURE: CPT | Performed by: INTERNAL MEDICINE

## 2019-04-19 PROCEDURE — 80053 COMPREHEN METABOLIC PANEL: CPT | Performed by: INTERNAL MEDICINE

## 2019-04-19 ASSESSMENT — MIFFLIN-ST. JEOR: SCORE: 1597.71

## 2019-04-19 NOTE — PATIENT INSTRUCTIONS
"Lab work downstairs today.  Directions:  As you walk through the first floor, you'll see (on the right) first the pharmacy, then some bathrooms, then the \"Lab and Imaging\" area. Give them your name at the window there and wait for them to call you.     If your bilirubin has not normalized, we can reconsider seeing a gastroenterology specialist.    Bayron Quinteros MD  Internal Medicine and Pediatrics     "

## 2019-08-01 ENCOUNTER — MYC MEDICAL ADVICE (OUTPATIENT)
Dept: PEDIATRICS | Facility: CLINIC | Age: 24
End: 2019-08-01

## 2019-09-11 ENCOUNTER — TELEPHONE (OUTPATIENT)
Dept: PEDIATRICS | Facility: CLINIC | Age: 24
End: 2019-09-11

## 2019-09-11 DIAGNOSIS — R79.89 ELEVATED LIVER FUNCTION TESTS: Primary | ICD-10-CM

## 2019-11-05 ENCOUNTER — HEALTH MAINTENANCE LETTER (OUTPATIENT)
Age: 24
End: 2019-11-05

## 2020-11-22 ENCOUNTER — HEALTH MAINTENANCE LETTER (OUTPATIENT)
Age: 25
End: 2020-11-22

## 2021-04-21 ENCOUNTER — MYC MEDICAL ADVICE (OUTPATIENT)
Dept: PEDIATRICS | Facility: CLINIC | Age: 26
End: 2021-04-21

## 2021-06-01 ENCOUNTER — MYC MEDICAL ADVICE (OUTPATIENT)
Dept: PEDIATRICS | Facility: CLINIC | Age: 26
End: 2021-06-01

## 2021-06-02 NOTE — TELEPHONE ENCOUNTER
Updated patient's chart with 2nd covid vaccine per [facility].     CARLTON PECK MA on 6/2/2021 at 9:12 AM

## 2021-08-02 ENCOUNTER — OFFICE VISIT (OUTPATIENT)
Dept: PEDIATRICS | Facility: CLINIC | Age: 26
End: 2021-08-02
Payer: COMMERCIAL

## 2021-08-02 VITALS
HEART RATE: 110 BPM | OXYGEN SATURATION: 98 % | SYSTOLIC BLOOD PRESSURE: 96 MMHG | HEIGHT: 68 IN | BODY MASS INDEX: 21.57 KG/M2 | WEIGHT: 142.3 LBS | RESPIRATION RATE: 16 BRPM | DIASTOLIC BLOOD PRESSURE: 60 MMHG | TEMPERATURE: 98.1 F

## 2021-08-02 DIAGNOSIS — B36.0 TINEA VERSICOLOR: ICD-10-CM

## 2021-08-02 DIAGNOSIS — Z00.00 ROUTINE GENERAL MEDICAL EXAMINATION AT A HEALTH CARE FACILITY: Primary | ICD-10-CM

## 2021-08-02 DIAGNOSIS — R79.89 ELEVATED LIVER FUNCTION TESTS: ICD-10-CM

## 2021-08-02 LAB
ALBUMIN SERPL-MCNC: 4 G/DL (ref 3.4–5)
ALP SERPL-CCNC: 66 U/L (ref 40–150)
ALT SERPL W P-5'-P-CCNC: 24 U/L (ref 0–70)
ANION GAP SERPL CALCULATED.3IONS-SCNC: 5 MMOL/L (ref 3–14)
AST SERPL W P-5'-P-CCNC: 18 U/L (ref 0–45)
BILIRUB DIRECT SERPL-MCNC: 0.3 MG/DL (ref 0–0.2)
BILIRUB SERPL-MCNC: 2.1 MG/DL (ref 0.2–1.3)
BUN SERPL-MCNC: 14 MG/DL (ref 7–30)
CALCIUM SERPL-MCNC: 9.3 MG/DL (ref 8.5–10.1)
CHLORIDE BLD-SCNC: 106 MMOL/L (ref 94–109)
CHOLEST SERPL-MCNC: 170 MG/DL
CO2 SERPL-SCNC: 29 MMOL/L (ref 20–32)
CREAT SERPL-MCNC: 1.03 MG/DL (ref 0.66–1.25)
FASTING STATUS PATIENT QL REPORTED: YES
GFR SERPL CREATININE-BSD FRML MDRD: >90 ML/MIN/1.73M2
GLUCOSE BLD-MCNC: 68 MG/DL (ref 70–99)
HDLC SERPL-MCNC: 63 MG/DL
LDLC SERPL CALC-MCNC: 83 MG/DL
NONHDLC SERPL-MCNC: 107 MG/DL
POTASSIUM BLD-SCNC: 3.3 MMOL/L (ref 3.4–5.3)
PROT SERPL-MCNC: 7 G/DL (ref 6.8–8.8)
SODIUM SERPL-SCNC: 140 MMOL/L (ref 133–144)
TRIGL SERPL-MCNC: 122 MG/DL

## 2021-08-02 PROCEDURE — 82248 BILIRUBIN DIRECT: CPT | Performed by: INTERNAL MEDICINE

## 2021-08-02 PROCEDURE — 36415 COLL VENOUS BLD VENIPUNCTURE: CPT | Performed by: INTERNAL MEDICINE

## 2021-08-02 PROCEDURE — 80053 COMPREHEN METABOLIC PANEL: CPT | Performed by: INTERNAL MEDICINE

## 2021-08-02 PROCEDURE — 99395 PREV VISIT EST AGE 18-39: CPT | Performed by: INTERNAL MEDICINE

## 2021-08-02 PROCEDURE — 80061 LIPID PANEL: CPT | Performed by: INTERNAL MEDICINE

## 2021-08-02 RX ORDER — FLUCONAZOLE 150 MG/1
300 TABLET ORAL WEEKLY
Qty: 4 TABLET | Refills: 0 | Status: SHIPPED | OUTPATIENT
Start: 2021-08-02

## 2021-08-02 ASSESSMENT — ENCOUNTER SYMPTOMS
PALPITATIONS: 0
SHORTNESS OF BREATH: 0
CONSTIPATION: 0
WEAKNESS: 0
JOINT SWELLING: 0
HEADACHES: 0
FREQUENCY: 0
ABDOMINAL PAIN: 0
HEMATOCHEZIA: 0
MYALGIAS: 0
HEMATURIA: 0
ARTHRALGIAS: 0
DYSURIA: 0
DIARRHEA: 0
PARESTHESIAS: 0
EYE PAIN: 0
NERVOUS/ANXIOUS: 0
NAUSEA: 0
FEVER: 0
SORE THROAT: 0
COUGH: 0
CHILLS: 0
DIZZINESS: 0
HEARTBURN: 0

## 2021-08-02 ASSESSMENT — MIFFLIN-ST. JEOR: SCORE: 1594.97
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
## 2021-08-02 NOTE — PATIENT INSTRUCTIONS
"Flu shot this fall.    If your liver function tests are within normal limits, then fill and begin diflucan for your Tinea once weekly for 2 weeks.    Lab work today:  We can do labs in the exam room today, or you can get them done downstairs in the lab.      If you are going downstairs:  Directions:  As you walk through the first floor, you'll see (on the right) first the pharmacy, then some bathrooms, then the \"Lab and Imaging\" area. Give them your name at the window there and wait for them to call you.       Preventive Health Recommendations  Male Ages 26 - 39    Yearly exam:             See your health care provider every year in order to  o   Review health changes.   o   Discuss preventive care.    o   Review your medicines if your doctor has prescribed any.    You should be tested each year for STDs (sexually transmitted diseases), if you re at risk.     After age 35, talk to your provider about cholesterol testing. If you are at risk for heart disease, have your cholesterol tested at least every 5 years.     If you are at risk for diabetes, you should have a diabetes test (fasting glucose).  Shots: Get a flu shot each year. Get a tetanus shot every 10 years.     Nutrition:    Eat at least 5 servings of fruits and vegetables daily.     Eat whole-grain bread, whole-wheat pasta and brown rice instead of white grains and rice.     Get adequate Calcium and Vitamin D.     Lifestyle    Exercise for at least 150 minutes a week (30 minutes a day, 5 days a week). This will help you control your weight and prevent disease.     Limit alcohol to one drink per day.     No smoking.     Wear sunscreen to prevent skin cancer.     See your dentist every six months for an exam and cleaning.     "

## 2021-08-12 ENCOUNTER — HOSPITAL ENCOUNTER (OUTPATIENT)
Dept: ULTRASOUND IMAGING | Facility: CLINIC | Age: 26
Discharge: HOME | End: 2021-08-12
Attending: INTERNAL MEDICINE | Admitting: INTERNAL MEDICINE
Payer: COMMERCIAL

## 2021-08-12 DIAGNOSIS — R79.89 ELEVATED LIVER FUNCTION TESTS: ICD-10-CM

## 2021-08-12 PROCEDURE — 76705 ECHO EXAM OF ABDOMEN: CPT

## 2021-09-19 ENCOUNTER — HEALTH MAINTENANCE LETTER (OUTPATIENT)
Age: 26
End: 2021-09-19

## 2021-11-14 ENCOUNTER — HEALTH MAINTENANCE LETTER (OUTPATIENT)
Age: 26
End: 2021-11-14

## 2022-11-20 ENCOUNTER — HEALTH MAINTENANCE LETTER (OUTPATIENT)
Age: 27
End: 2022-11-20